# Patient Record
Sex: MALE | Race: WHITE
[De-identification: names, ages, dates, MRNs, and addresses within clinical notes are randomized per-mention and may not be internally consistent; named-entity substitution may affect disease eponyms.]

---

## 2018-04-13 LAB
BUN BLD-MCNC: 10 MG/DL (ref 6–20)
GLUCOSE SERPLBLD-MCNC: 113 MG/DL (ref 65–120)
HCT VFR BLD CALC: 45.2 % (ref 39.6–49)
INR BLD: 1.01
LYMPHOCYTES # SPEC AUTO: 1.3 K/UL (ref 0.7–4.9)
MCH RBC QN AUTO: 30.9 PG (ref 27–35)
MCV RBC: 92 FL (ref 80–100)
PMV BLD: 9.1 FL (ref 7.6–11.3)
POTASSIUM SERPL-SCNC: 4.2 MEQ/L (ref 3.6–5)
RBC # BLD: 4.91 M/UL (ref 4.33–5.43)

## 2018-04-15 NOTE — EKG
Test Date:    2018-04-13               Test Time:    08:51:09

Technician:   MICAELA                                    

                                                     

MEASUREMENT RESULTS:                                       

Intervals:                                           

Rate:         60                                     

KY:           164                                    

QRSD:         100                                    

QT:           402                                    

QTc:          402                                    

Axis:                                                

P:            43                                     

KY:           164                                    

QRS:          -23                                    

T:            13                                     

                                                     

INTERPRETIVE STATEMENTS:                                       

                                                     

Normal sinus rhythm

Normal ECG

No previous ECG available for comparison



Electronically Signed On 04-15-18 22:44:06 CDT by Niall Colon

## 2018-04-18 ENCOUNTER — HOSPITAL ENCOUNTER (OUTPATIENT)
Dept: HOSPITAL 97 - OR | Age: 48
Discharge: HOME | End: 2018-04-18
Attending: ORTHOPAEDIC SURGERY
Payer: COMMERCIAL

## 2018-04-18 DIAGNOSIS — F17.220: ICD-10-CM

## 2018-04-18 DIAGNOSIS — M75.22: ICD-10-CM

## 2018-04-18 DIAGNOSIS — M75.102: Primary | ICD-10-CM

## 2018-04-18 DIAGNOSIS — S43.432A: ICD-10-CM

## 2018-04-18 PROCEDURE — 80048 BASIC METABOLIC PNL TOTAL CA: CPT

## 2018-04-18 PROCEDURE — 93005 ELECTROCARDIOGRAM TRACING: CPT

## 2018-04-18 PROCEDURE — 0RBK4ZZ EXCISION OF LEFT SHOULDER JOINT, PERCUTANEOUS ENDOSCOPIC APPROACH: ICD-10-PCS

## 2018-04-18 PROCEDURE — 0LS40ZZ REPOSITION LEFT UPPER ARM TENDON, OPEN APPROACH: ICD-10-PCS

## 2018-04-18 PROCEDURE — 73020 X-RAY EXAM OF SHOULDER: CPT

## 2018-04-18 PROCEDURE — 0LQ24ZZ REPAIR LEFT SHOULDER TENDON, PERCUTANEOUS ENDOSCOPIC APPROACH: ICD-10-PCS

## 2018-04-18 PROCEDURE — 85730 THROMBOPLASTIN TIME PARTIAL: CPT

## 2018-04-18 PROCEDURE — 85025 COMPLETE CBC W/AUTO DIFF WBC: CPT

## 2018-04-18 PROCEDURE — 85610 PROTHROMBIN TIME: CPT

## 2018-04-18 PROCEDURE — 36415 COLL VENOUS BLD VENIPUNCTURE: CPT

## 2018-04-18 NOTE — XMS REPORT
Clinical Summary

 Created on:2018



Patient:MayenAnnetta kidd Rick

Sex:Male

:1970

External Reference #:[id number]





Demographics







 Address  [address] [address]

 

 Home Phone  [phone]

 

 Home Phone  [phone]

 

 Email Address  [email or web address]

 

 Preferred Language  English

 

 Marital Status  

 

 Adventist Affiliation  Unknown

 

 Race  White

 

 Ethnic Group  Not  or 









Author







 Organization  [facility]

 

 Address  [address] [place] [id number]









Support







 Name  Relationship  Address  Phone

 

 Adi Mayen  Spouse  Unavailable  [phone]









Care Team Providers







 Name  Role  Phone

 

 Asked, No Pcp  Primary Care Provider  Unavailable









Allergies

No Known Allergies



Current Medications

No known medications



Active Problems

Not on file



Social History







 Tobacco Use  Types  Packs/Day  Years Used  Date

 

 Never Smoker        









 Alcohol Use  Drinks/Week  oz/Week  Comments

 

 Yes      









 Sex Assigned at Birth  Date Recorded

 

 Not on file  







Last Filed Vital Signs

Not on file



Plan of Treatment







 Health Maintenance  Due Date  Last Done  Comments

 

 COLONOSCOPY  2018    

 

 INFLUENZA VACCINE  2018    







Results

Not on fileafter 2017



Insurance







 Payer  Benefit Plan / Group  Subscriber ID  Type  Phone  Address

 

 [employer]  [employer]BS CHOICE PPO/F[employer] PPO  xxxxxxxxxxxx  PPO    









 Guarantor Name  Account Type  Relation to  Date of  Phone  Billing



     Patient  Birth    Address

 

 STEVENSONANNETTA RICK  Personal/Family  Self  1970  Home:  [address]







         [phone]  [address]



         [phone]

## 2018-04-18 NOTE — P.BOP
Preoperative diagnosis: left shoulder rotator cuff tear, SLAP tear, biceps 

tenosynovitis


Postoperative diagnosis: same


Primary procedure: L shoulder arthroscopic rotator cuff (subscapularis, 

supraspinatus) repair


Secondary procedure: left shoulder arthroscopic SLAP debridement


Other procedure(s): left shoulder open subpectoral biceps tenodesis


Assistant: NONE,NONE


Estimated blood loss: <10 cc


Specimen: none


Findings: see dictation


Anesthesia: General


Complications: None


Implants: 2- 5.5 mm corkscrew, 2- 4.75 mm  swivelock, 7x19 mm biotenodesis screw


Fluids & blood products: per anesthesia


Transferred to: Recovery Room


Condition: Good

## 2018-04-18 NOTE — RAD REPORT
EXAM DESCRIPTION:  RAD - Shoulder 1 View - 4/18/2018 2:37 pm

 

FINDINGS:  Single portable view of the left shoulder was obtained in. No fracture or dislocation. No 
acute shoulder joint finding.

## 2018-04-19 NOTE — OP
Date of Procedure:  04/18/2018



Surgeon:  Daniele Deshpande MD



Assistant:  None.



Preoperative Diagnoses:  

1.Left shoulder rotator cuff tear.

2.Left shoulder SLAP tear.

3.Left shoulder bicipital tenosynovitis.



Postoperative Diagnoses:  

1.Left shoulder rotator cuff tear.

2.Left shoulder SLAP tear.

3.Left shoulder bicipital tenosynovitis.



Procedures Performed:  

1.Left shoulder arthroscopic rotator cuff repair.

2.Left shoulder arthroscopic SLAP tear debridement.

3.Left shoulder open subpectoral biceps tenodesis.



Anesthesia:  General endotracheal.



Fluids:  Per Anesthesia record.



Estimated Blood Loss:  Less than 10 cc.



Complications:  None.



Implants:  

1.Two 5.5 mm Arthrex corkscrews.

2.Two 4.75 mm Arthrex SwiveLocks.

3.A 7 x 19 mm Bio-Tenodesis screw.



Indication For Procedure:  Annetta is a 48-year-old male who presented to my clinic with signs, sympt
oms, and MRI findings consistent with left shoulder rotator cuff tear including the subscapularis as 
well as a bicipital tenosynovitis and SLAP tear.  I discussed with the patient at length risks and be
nefits associated with operative and nonoperative treatment.  He expressed understanding and elected 
to proceed with operative treatment.



Description Of Procedure:  After informed consent was obtained, the patient was identified in the pre
operative holding area.  The left upper extremity was marked.  He was then brought back to the PACU, 
where he underwent an interscalene block performed by Anesthesia.  He was then taken back to the oper
ating room, transferred to the operating table in a supine fashion, and placed under general endotrac
heal anesthesia.  He was placed in the beach chair position with his extremities well padded.  The le
ft upper extremity was examined.  The patient had full range of motion and no instability of his left
 shoulder joint.  The left upper extremity was then prepped and draped in usual sterile fashion.  A t
soren-out was initiated.  The correct patient and procedure were confirmed and identified.  The patient
 did receive his preoperative prophylactic antibiotics.  Via the posterior portal position, a spinal 
needle was introduced in the glenohumeral joint and the shoulder was injected with 30 cc of normal sa
line.  A posterior portal was then created followed by an anterior portal.  A diagnostic arthroscopy 
was performed.  The patient was noted to have a subluxated biceps tendon secondary to full-thickness 
subscapularis tear.  There was some fraying of the biceps tendon near the anchor and at that point, i
t was elected to proceed with a biceps tenodesis.  The biceps tenotomy was performed using a meniscal
 biter and the biceps tendon was cut at the insertion at the glenoid rim.  The humeral head and gleno
id were devoid of any large chondral injuries.  The patient was also noted to have a retracted supras
pinatus tear near the level of the glenoid anteriorly, consistent with an L-shaped tear pattern.  The
 subscapularis was also torn just medial to the glenoid.  There were no loose bodies noted within the
 axillary pouch.  The anterior and posterior labrum were found to be intact.  The superior labrum at 
the SLAP tear was then debrided using an arthroscopic shaver.  Attention was first taken to the subsc
apularis repair.  An anterior lateral cannula was also placed.  A traction stitch was placed using a 
scorpion suture passer and there was gentle traction placed on the subscapularis.  Both elevator as w
ell as an arthroscopic shaver and radiofrequency ablator were then used to clear off any adhesions to
 both the anterior and posterior aspects of the subscapularis.  An arthroscopic shaver was then used 
to debride the lesser tuberosity and create a bleeding bony bed.  A single 5.5 mm double-loaded Arthr
ex corkscrew was then placed in the lesser tuberosity without complication and the sutures were passe
d through the subscapularis from an inferior-to-superior direction in a horizontal mattress fashion. 
 The sutures were tied and there was good reduction of the subscapularis on the lesser tuberosity.  T
he remaining sutures were then cut.  Next, the arthroscope was brought into the subacromial space and
 subacromial bursectomy was performed.  The greater tuberosity was then debrided using an arthroscopi
c shaver to create a bleeding bony bed.  The anterior release was then placed on the supraspinatus gi
bina its retracted nature.  An elevator was then used to help elevate the supraspinatus off the superi
or aspect of the glenoid and the superior aspect of the supraspinatus was also debrided.  An arthrosThumbtack
opic grasper was then used and it was noted that there was good reduction of the supraspinatus on the
 footprint of the greater tuberosity.  Next, a stab incision was made laterally at the site of the ac
romion and a 5.5 mm Arthrex corkscrew was placed which was double loaded.  The sutures were then pass
ed through the supraspinatus in anterior-to-posterior fashion in a horizontal mattress fashion and th
ey were tied and there was good reduction of the supraspinatus on the greater tuberosity.  Two 4.75 m
m SwiveLocks were then placed for lateral fixation using a crisscross fashion.  There was good overal
l reduction of the supraspinatus on its footprint.  The remaining sutures were then cut.  Next, atten
tion was taken to the subpectoral biceps tenodesis.  The arthroscopic instruments were then removed. 
 Approximately, a 4 cm longitudinal incision was made just medial to the pec insertion on the proxima
l humerus.  Dissection was then taken down to the fascia, which was split and divided.  The pec tendo
n was then gently retracted superiorly and the long head of the biceps tendon was then removed out of
 the incision, 3 cm distal to the musculotendinous junction.  It was marked and whipstitched using an
 Arthrex FiberLoop.  The remaining tendon was then cut.  The guide pin was then placed within the bic
ipital groove in a unicortical fashion followed by a 7.5-mm reamer.  A 7 x 19 mm Arthrex tenodesis sc
rew was then placed with 1 suture through the screw and placed within the tunnel within the proximal 
humerus.  There was good fit of the tendon as well as the screw and the suture was then tied over the
 tenodesis and remaining sutures were cut.  The wound was then irrigated thoroughly with normal salin
e.  Subcutaneous tissue was approximated using 2-0 Vicryls.  The skin and portals were approximated u
sing a 3-0 Monocryl.  Sterile dressings were applied.  The patient was placed in a shoulder immobiliz
er and awakened and transferred to PACU in stable condition.



Postoperative Plan:  We will follow a large rotator cuff repair protocol.  We will hold off on physic
al therapy until 6 weeks postoperatively.  We will minimize external rotation given his 

subscapularis repair and he will follow up with me in clinic next week for wound checks.





ANN/MODL

DD:  04/18/2018 20:34:23Voice ID:  [id number]

DT:  04/19/2018 06:46:53Report ID:  [id number]